# Patient Record
Sex: MALE | Race: BLACK OR AFRICAN AMERICAN | NOT HISPANIC OR LATINO | Employment: OTHER | URBAN - NONMETROPOLITAN AREA
[De-identification: names, ages, dates, MRNs, and addresses within clinical notes are randomized per-mention and may not be internally consistent; named-entity substitution may affect disease eponyms.]

---

## 2023-05-25 ENCOUNTER — DOCUMENTATION ONLY (OUTPATIENT)
Dept: FAMILY MEDICINE | Facility: CLINIC | Age: 33
End: 2023-05-25

## 2023-08-22 ENCOUNTER — DOCUMENTATION ONLY (OUTPATIENT)
Dept: FAMILY MEDICINE | Facility: CLINIC | Age: 33
End: 2023-08-22

## 2023-08-23 ENCOUNTER — DOCUMENTATION ONLY (OUTPATIENT)
Dept: FAMILY MEDICINE | Facility: CLINIC | Age: 33
End: 2023-08-23

## 2023-09-07 ENCOUNTER — DOCUMENTATION ONLY (OUTPATIENT)
Dept: PEDIATRICS | Facility: CLINIC | Age: 33
End: 2023-09-07

## 2024-03-06 ENCOUNTER — HOSPITAL ENCOUNTER (EMERGENCY)
Facility: HOSPITAL | Age: 34
Discharge: HOME | End: 2024-03-07
Attending: INTERNAL MEDICINE
Payer: MEDICARE

## 2024-03-06 DIAGNOSIS — R07.9 CHEST PAIN, UNSPECIFIED TYPE: Primary | ICD-10-CM

## 2024-03-06 DIAGNOSIS — R07.9 CHEST PAIN: ICD-10-CM

## 2024-03-06 PROCEDURE — 93005 ELECTROCARDIOGRAM TRACING: CPT

## 2024-03-06 PROCEDURE — 99285 EMERGENCY DEPT VISIT HI MDM: CPT | Mod: 25

## 2024-03-06 PROCEDURE — 83880 ASSAY OF NATRIURETIC PEPTIDE: CPT | Performed by: INTERNAL MEDICINE

## 2024-03-06 PROCEDURE — 85025 COMPLETE CBC W/AUTO DIFF WBC: CPT | Performed by: INTERNAL MEDICINE

## 2024-03-07 VITALS
HEART RATE: 96 BPM | OXYGEN SATURATION: 98 % | HEIGHT: 69 IN | TEMPERATURE: 98 F | BODY MASS INDEX: 44.43 KG/M2 | DIASTOLIC BLOOD PRESSURE: 96 MMHG | RESPIRATION RATE: 18 BRPM | WEIGHT: 300 LBS | SYSTOLIC BLOOD PRESSURE: 146 MMHG

## 2024-03-07 PROBLEM — E11.9 DIABETES MELLITUS: Status: ACTIVE | Noted: 2024-03-07

## 2024-03-07 PROBLEM — I10 HYPERTENSION: Status: ACTIVE | Noted: 2024-03-07

## 2024-03-07 PROBLEM — E78.00 HYPERCHOLESTEROLEMIA: Status: ACTIVE | Noted: 2024-03-07

## 2024-03-07 LAB
ALBUMIN SERPL-MCNC: 3.8 G/DL (ref 3.5–5)
ALBUMIN/GLOB SERPL: 1.1 RATIO (ref 1.1–2)
ALP SERPL-CCNC: 91 UNIT/L (ref 40–150)
ALT SERPL-CCNC: 23 UNIT/L (ref 0–55)
AST SERPL-CCNC: 14 UNIT/L (ref 5–34)
BASOPHILS # BLD AUTO: 0.03 X10(3)/MCL
BASOPHILS NFR BLD AUTO: 0.3 %
BILIRUB SERPL-MCNC: 0.3 MG/DL
BNP BLD-MCNC: <10 PG/ML
BUN SERPL-MCNC: 12 MG/DL (ref 8.9–20.6)
CALCIUM SERPL-MCNC: 9.4 MG/DL (ref 8.4–10.2)
CHLORIDE SERPL-SCNC: 103 MMOL/L (ref 98–107)
CO2 SERPL-SCNC: 27 MMOL/L (ref 22–29)
CREAT SERPL-MCNC: 0.91 MG/DL (ref 0.73–1.18)
D DIMER PPP IA.FEU-MCNC: 0.34 UG/ML FEU (ref 0–0.5)
EOSINOPHIL # BLD AUTO: 0.26 X10(3)/MCL (ref 0–0.9)
EOSINOPHIL NFR BLD AUTO: 2.9 %
ERYTHROCYTE [DISTWIDTH] IN BLOOD BY AUTOMATED COUNT: 13.4 % (ref 11.5–17)
GFR SERPLBLD CREATININE-BSD FMLA CKD-EPI: >60 MLS/MIN/1.73/M2
GLOBULIN SER-MCNC: 3.4 GM/DL (ref 2.4–3.5)
GLUCOSE SERPL-MCNC: 115 MG/DL (ref 74–100)
HCT VFR BLD AUTO: 40.4 % (ref 42–52)
HGB BLD-MCNC: 13.2 G/DL (ref 14–18)
IMM GRANULOCYTES # BLD AUTO: 0.02 X10(3)/MCL (ref 0–0.04)
IMM GRANULOCYTES NFR BLD AUTO: 0.2 %
LYMPHOCYTES # BLD AUTO: 2.44 X10(3)/MCL (ref 0.6–4.6)
LYMPHOCYTES NFR BLD AUTO: 27.3 %
MCH RBC QN AUTO: 28.1 PG (ref 27–31)
MCHC RBC AUTO-ENTMCNC: 32.7 G/DL (ref 33–36)
MCV RBC AUTO: 86.1 FL (ref 80–94)
MONOCYTES # BLD AUTO: 0.78 X10(3)/MCL (ref 0.1–1.3)
MONOCYTES NFR BLD AUTO: 8.7 %
NEUTROPHILS # BLD AUTO: 5.42 X10(3)/MCL (ref 2.1–9.2)
NEUTROPHILS NFR BLD AUTO: 60.6 %
OHS QRS DURATION: 80 MS
OHS QTC CALCULATION: 445 MS
PLATELET # BLD AUTO: 155 X10(3)/MCL (ref 130–400)
PMV BLD AUTO: 11.9 FL (ref 7.4–10.4)
POTASSIUM SERPL-SCNC: 3.6 MMOL/L (ref 3.5–5.1)
PROT SERPL-MCNC: 7.2 GM/DL (ref 6.4–8.3)
RBC # BLD AUTO: 4.69 X10(6)/MCL (ref 4.7–6.1)
SODIUM SERPL-SCNC: 139 MMOL/L (ref 136–145)
TROPONIN I SERPL-MCNC: <0.01 NG/ML (ref 0–0.04)
WBC # SPEC AUTO: 8.95 X10(3)/MCL (ref 4.5–11.5)

## 2024-03-07 PROCEDURE — 85379 FIBRIN DEGRADATION QUANT: CPT | Performed by: INTERNAL MEDICINE

## 2024-03-07 PROCEDURE — 84484 ASSAY OF TROPONIN QUANT: CPT | Performed by: INTERNAL MEDICINE

## 2024-03-07 PROCEDURE — 80053 COMPREHEN METABOLIC PANEL: CPT | Performed by: INTERNAL MEDICINE

## 2024-03-07 RX ORDER — SITAGLIPTIN AND METFORMIN HYDROCHLORIDE 1000; 100 MG/1; MG/1
1 TABLET, FILM COATED, EXTENDED RELEASE ORAL DAILY
COMMUNITY
Start: 2024-02-01

## 2024-03-07 RX ORDER — LISINOPRIL 10 MG/1
10 TABLET ORAL DAILY
COMMUNITY
Start: 2023-12-20

## 2024-03-07 NOTE — DISCHARGE INSTRUCTIONS
See a cardiologist/Heart Doctor to do a stress test and evaluate further as soon as possible,    Avoid any strenuous activity till you see the cardiologist.    If you do not have a cardiologist talk to your family doctor to refer you to one today.    Take an aspirin every day if not allergic to it.        Take medicines as prescribed    See your family doctor in one to 2 days for further evaluation, workup, and treatment as necessary    Avoid driving or operating machinery while taking medicines as some medicines might cause drowsiness and may cause problems. Also pain medicines have potential of being addictive  so use Pain meds specially Narcotics Sparingly.    The exam and treatment you received in Emergency Room was for an urgent problem and NOT INTENDED AS COMPLETE CARE. It is important that you FOLLOW UP with a doctor for ongoing care. If your symptoms become WORSE or you DO NOT IMPROVE and you are unable to reach your health care provider, you should RETURN to the emergency department. The Emergency Room doctor has provided a PRELIMINARY INTERPRETATION of all your STUDIES. A final interpretation may be done after you are discharged. IF A CHANGE in your diagnosis or treatment is needed WE WILL CONTACT YOU. It is critical that we have a CURRENT PHONE NUMBER FOR YOU.

## 2024-03-07 NOTE — ED PROVIDER NOTES
Encounter Date: 3/6/2024  History from patient     History     Chief Complaint   Patient presents with    Chest Pain     Pt brought [facility] with c/o cp x 45 min.     LINDA Shirley is 33 y.o. male who  has a past medical history of Anxiety disorder, unspecified, Diabetes mellitus, Hypercholesterolemia, and Hypertension. arrives in ER with c/o Chest Pain (Pt brought in [facility] with c/o cp x 45 min.)      Review of patient's allergies indicates:  No Known Allergies  Past Medical History:   Diagnosis Date    Anxiety disorder, unspecified     Diabetes mellitus     Hypercholesterolemia     Hypertension      History reviewed. No pertinent surgical history.  Family History   Problem Relation Age of Onset    Cancer Mother     Cancer Father     Leukemia Father      Social History     Tobacco Use    Smoking status: Never    Smokeless tobacco: Never   Substance Use Topics    Alcohol use: Never    Drug use: Never     Patient Active Problem List   Diagnosis    Hypertension    Diabetes mellitus    Hypercholesterolemia       Review of Systems   Constitutional:  Negative for fever.   HENT:  Negative for trouble swallowing and voice change.    Eyes:  Negative for visual disturbance.   Respiratory:  Positive for chest tightness and shortness of breath. Negative for cough.    Cardiovascular:  Negative for chest pain.   Gastrointestinal:  Negative for abdominal pain, diarrhea and vomiting.   Genitourinary:  Negative for dysuria and hematuria.   Musculoskeletal:  Negative for back pain and gait problem.   Skin:  Negative for color change and rash.   Neurological:  Negative for headaches.   Psychiatric/Behavioral:  Negative for behavioral problems and sleep disturbance.    All other systems reviewed and are negative.      Physical Exam     Initial Vitals [03/06/24 2336]   BP Pulse Resp Temp SpO2   (!) 183/116 103 18 98.1 °F (36.7 °C) 99 %      MAP       --         Physical Exam    Nursing note and vitals  reviewed.  Constitutional: He appears well-developed and well-nourished. No distress.   Patient with a high BMI   HENT:   Head: Atraumatic.   Eyes: EOM are normal.   Neck: Neck supple.   Cardiovascular:  Normal rate, regular rhythm and normal heart sounds.           Pulmonary/Chest: Breath sounds normal. No respiratory distress. He has no wheezes.   Abdominal: Abdomen is soft. Bowel sounds are normal. He exhibits no distension. There is no abdominal tenderness. There is no rebound and no guarding.   Musculoskeletal:         General: No edema. Normal range of motion.      Cervical back: Neck supple. No bony tenderness.     Neurological: He is alert. GCS score is 15. GCS eye subscore is 4. GCS verbal subscore is 5. GCS motor subscore is 6.   Speech Normal   Skin: Skin is dry.   Psychiatric: He has a normal mood and affect.   Pleasant         ED Course   Procedures  Orders Placed This Encounter   Procedures    X-ray Chest AP Portable    Comprehensive metabolic panel    CBC auto differential    Troponin I    Brain natriuretic peptide    CBC with Differential    D dimer, quantitative    EKG 12-LEAD     Medications - No data to display  Admission on 03/06/2024, Discharged on 03/07/2024   Component Date Value Ref Range Status    Sodium Level 03/07/2024 139  136 - 145 mmol/L Final    Potassium Level 03/07/2024 3.6  3.5 - 5.1 mmol/L Final    Chloride 03/07/2024 103  98 - 107 mmol/L Final    Carbon Dioxide 03/07/2024 27  22 - 29 mmol/L Final    Glucose Level 03/07/2024 115 (H)  74 - 100 mg/dL Final    Blood Urea Nitrogen 03/07/2024 12.0  8.9 - 20.6 mg/dL Final    Creatinine 03/07/2024 0.91  0.73 - 1.18 mg/dL Final    Calcium Level Total 03/07/2024 9.4  8.4 - 10.2 mg/dL Final    Protein Total 03/07/2024 7.2  6.4 - 8.3 gm/dL Final    Albumin Level 03/07/2024 3.8  3.5 - 5.0 g/dL Final    Globulin 03/07/2024 3.4  2.4 - 3.5 gm/dL Final    Albumin/Globulin Ratio 03/07/2024 1.1  1.1 - 2.0 ratio Final    Bilirubin Total 03/07/2024  0.3  <=1.5 mg/dL Final    Alkaline Phosphatase 03/07/2024 91  40 - 150 unit/L Final    Alanine Aminotransferase 03/07/2024 23  0 - 55 unit/L Final    Aspartate Aminotransferase 03/07/2024 14  5 - 34 unit/L Final    eGFR 03/07/2024 >60  mls/min/1.73/m2 Final    Troponin-I 03/07/2024 <0.010  0.000 - 0.045 ng/mL Final    Natriuretic Peptide 03/06/2024 <10.0  <=100.0 pg/mL Final    WBC 03/06/2024 8.95  4.50 - 11.50 x10(3)/mcL Final    RBC 03/06/2024 4.69 (L)  4.70 - 6.10 x10(6)/mcL Final    Hgb 03/06/2024 13.2 (L)  14.0 - 18.0 g/dL Final    Hct 03/06/2024 40.4 (L)  42.0 - 52.0 % Final    MCV 03/06/2024 86.1  80.0 - 94.0 fL Final    MCH 03/06/2024 28.1  27.0 - 31.0 pg Final    MCHC 03/06/2024 32.7 (L)  33.0 - 36.0 g/dL Final    RDW 03/06/2024 13.4  11.5 - 17.0 % Final    Platelet 03/06/2024 155  130 - 400 x10(3)/mcL Final    MPV 03/06/2024 11.9 (H)  7.4 - 10.4 fL Final    Neut % 03/06/2024 60.6  % Final    Lymph % 03/06/2024 27.3  % Final    Mono % 03/06/2024 8.7  % Final    Eos % 03/06/2024 2.9  % Final    Basophil % 03/06/2024 0.3  % Final    Lymph # 03/06/2024 2.44  0.6 - 4.6 x10(3)/mcL Final    Neut # 03/06/2024 5.42  2.1 - 9.2 x10(3)/mcL Final    Mono # 03/06/2024 0.78  0.1 - 1.3 x10(3)/mcL Final    Eos # 03/06/2024 0.26  0 - 0.9 x10(3)/mcL Final    Baso # 03/06/2024 0.03  <=0.2 x10(3)/mcL Final    IG# 03/06/2024 0.02  0 - 0.04 x10(3)/mcL Final    IG% 03/06/2024 0.2  % Final    D-Dimer 03/07/2024 0.34  0.00 - 0.50 ug/mL FEU Final         Labs Reviewed   COMPREHENSIVE METABOLIC PANEL - Abnormal; Notable for the following components:       Result Value    Glucose Level 115 (*)     All other components within normal limits   CBC WITH DIFFERENTIAL - Abnormal; Notable for the following components:    RBC 4.69 (*)     Hgb 13.2 (*)     Hct 40.4 (*)     MCHC 32.7 (*)     MPV 11.9 (*)     All other components within normal limits   TROPONIN I - Normal   B-TYPE NATRIURETIC PEPTIDE - Normal   D DIMER, QUANTITATIVE - Normal    CBC W/ AUTO DIFFERENTIAL    Narrative:     The following orders were created for panel order CBC auto differential.  Procedure                               Abnormality         Status                     ---------                               -----------         ------                     CBC with Differential[[id number]]        Abnormal            Final result                 Please view results for these tests on the individual orders.        ECG Results              EKG 12-LEAD (Preliminary result)  Result time 03/06/24 23:50:06      Wet Read by Cathleen Zamora MD (03/06/24 23:50:06, [facility] - Emergency Dept, Emergency Medicine)    EKG Initial Reading: Independently Interpreted by Cathleen Zamora MD. independently as: No STEMI. Rhythm: Normal Sinus Rhythm, Rate 102. Ectopy: No Ectopy. Conduction: Normal. ST Segments: Normal ST Segments. T Waves: Normal. Axis: Normal.                                   Imaging Results              X-ray Chest AP Portable (Preliminary result)  Result time 03/07/24 00:40:48      Wet Read by Cathleen Zamora MD (03/07/24 00:40:48, [facility] - Emergency Dept, Emergency Medicine)    Chest One View:  Independently reviewed and/or interpreted by Cathleen Zamora MD.  No Focal Consolidation, No Acute Cardiopulmonary abnormality identified grossly.                                     Medications - No data to display  Medical Decision Making    William Shirley is 33 y.o. male who  has a past medical history of Anxiety disorder, unspecified, Diabetes mellitus, Hypercholesterolemia, and Hypertension. arrives in ER with c/o Chest Pain ([facility] brought in [facility] with c/o cp x 45 min.)    Patient with elevated BMI with history of diabetes, hypertension, hypercholesterolemia comes to the emergency room by ambulance with complaint of chest pain which started while he was watching television, according to medics his blood pressure was elevated, they gave him  nitroglycerin, splint, he still has some chest pain, patient denies any other complaints whatsoever.    According to patient's record he is supposed to be on 3 blood pressure medicines, medicine for diabetes, medicine for anxiety and depression, but he says he is only taking 1 medicine for diabetes and 1 medicine for blood pressure which appears to be Janumet and lisinopril, he has not taking any other medicines whatsoever.  He says they just did not give him the medicine anymore when he went back to his doctor.    EKG on arrival does not show any acute abnormality he has a heart rate of 102, his oxygen saturation is 99% on room air, blood pressure is elevated, I am going to monitor it for the time being and do a cardiac workup on him, I will do a D-dimer since he has a heart rate of 103 but his oxygen saturations 99%.    Amount and/or Complexity of Data Reviewed  Labs: ordered.  Radiology: ordered and independent interpretation performed.      Additional MDM:   Differential Diagnosis:   Symptom: Chest pain. <> The follow diagnoses were considered and will be evaluated: Acute MI, Chest Wall Pain, GERD, Pneumonia, Pulmonary Embolism and ST Elevation MI.      Chest Pain: Medications: Aspirin and Nitroglycerin SL. Initial EKG: no acute changes.           ED Course as of 03/07/24 0408   Thu Mar 07, 2024   0054 Patient's workup is essentially negative in the emergency room, his troponin is 0, BNP A0, chest x-ray is normal, EKG is normal, chemistry overall is normal, I will advise him that he should see his family doctor to adjust his blood pressure medicine and he can see a cardiologist to do a stress test on him. [GQ]   0056 Patient's blood pressures come down, he is feeling better I will let him go home. [GQ]      ED Course User Index  [GQ] Cathleen Zamora MD                           Clinical Impression:  Final diagnoses:  [R07.9] Chest pain  [R07.9] Chest pain, unspecified type (Primary)          ED Disposition  Condition    Discharge Stable          ED Prescriptions    None       Follow-up Information       Follow up With Specialties Details Why Contact Info    Tutu Tao MD Family Medicine In 2 days  [address] [facility] [address]  [phone]      Cardiologist                 Cathleen Zamora MD  03/07/24 8712

## 2024-03-07 NOTE — ED NOTES
Pt to ed rm 4 form [facility]. Aaox4. Pt reports left sided chest pain that started 30min ago. Reports mild sob. Chest pain in constant. Hx of dm, htn. In no distress. On monitors. Wctm

## 2024-03-11 ENCOUNTER — HOSPITAL ENCOUNTER (EMERGENCY)
Facility: HOSPITAL | Age: 34
Discharge: HOME | End: 2024-03-11
Attending: INTERNAL MEDICINE
Payer: MEDICARE

## 2024-03-11 VITALS
RESPIRATION RATE: 20 BRPM | WEIGHT: 300 LBS | DIASTOLIC BLOOD PRESSURE: 109 MMHG | HEART RATE: 73 BPM | HEIGHT: 69 IN | SYSTOLIC BLOOD PRESSURE: 166 MMHG | OXYGEN SATURATION: 99 % | BODY MASS INDEX: 44.43 KG/M2 | TEMPERATURE: 97 F

## 2024-03-11 DIAGNOSIS — R07.9 CHEST PAIN, UNSPECIFIED TYPE: ICD-10-CM

## 2024-03-11 DIAGNOSIS — I10 UNCONTROLLED HYPERTENSION: Primary | ICD-10-CM

## 2024-03-11 LAB
ALBUMIN SERPL-MCNC: 3.9 G/DL (ref 3.5–5)
ALBUMIN/GLOB SERPL: 1.1 RATIO (ref 1.1–2)
ALP SERPL-CCNC: 95 UNIT/L (ref 40–150)
ALT SERPL-CCNC: 27 UNIT/L (ref 0–55)
AST SERPL-CCNC: 20 UNIT/L (ref 5–34)
BASOPHILS # BLD AUTO: 0.04 X10(3)/MCL
BASOPHILS NFR BLD AUTO: 0.5 %
BILIRUB SERPL-MCNC: 0.4 MG/DL
BNP BLD-MCNC: 55.4 PG/ML
BUN SERPL-MCNC: 10 MG/DL (ref 8.9–20.6)
CALCIUM SERPL-MCNC: 9.3 MG/DL (ref 8.4–10.2)
CHLORIDE SERPL-SCNC: 102 MMOL/L (ref 98–107)
CO2 SERPL-SCNC: 30 MMOL/L (ref 22–29)
CREAT SERPL-MCNC: 0.96 MG/DL (ref 0.73–1.18)
EOSINOPHIL # BLD AUTO: 0.18 X10(3)/MCL (ref 0–0.9)
EOSINOPHIL NFR BLD AUTO: 2.3 %
ERYTHROCYTE [DISTWIDTH] IN BLOOD BY AUTOMATED COUNT: 13 % (ref 11.5–17)
GFR SERPLBLD CREATININE-BSD FMLA CKD-EPI: >60 MLS/MIN/1.73/M2
GLOBULIN SER-MCNC: 3.7 GM/DL (ref 2.4–3.5)
GLUCOSE SERPL-MCNC: 150 MG/DL (ref 74–100)
HCT VFR BLD AUTO: 42.8 % (ref 42–52)
HGB BLD-MCNC: 14 G/DL (ref 14–18)
IMM GRANULOCYTES # BLD AUTO: 0.03 X10(3)/MCL (ref 0–0.04)
IMM GRANULOCYTES NFR BLD AUTO: 0.4 %
LYMPHOCYTES # BLD AUTO: 2.54 X10(3)/MCL (ref 0.6–4.6)
LYMPHOCYTES NFR BLD AUTO: 32.4 %
MCH RBC QN AUTO: 28.2 PG (ref 27–31)
MCHC RBC AUTO-ENTMCNC: 32.7 G/DL (ref 33–36)
MCV RBC AUTO: 86.1 FL (ref 80–94)
MONOCYTES # BLD AUTO: 0.7 X10(3)/MCL (ref 0.1–1.3)
MONOCYTES NFR BLD AUTO: 8.9 %
NEUTROPHILS # BLD AUTO: 4.35 X10(3)/MCL (ref 2.1–9.2)
NEUTROPHILS NFR BLD AUTO: 55.5 %
OHS QRS DURATION: 82 MS
OHS QTC CALCULATION: 412 MS
PLATELET # BLD AUTO: 247 X10(3)/MCL (ref 130–400)
PMV BLD AUTO: 11.1 FL (ref 7.4–10.4)
POCT GLUCOSE: 157 MG/DL (ref 70–110)
POTASSIUM SERPL-SCNC: 4.6 MMOL/L (ref 3.5–5.1)
PROT SERPL-MCNC: 7.6 GM/DL (ref 6.4–8.3)
RBC # BLD AUTO: 4.97 X10(6)/MCL (ref 4.7–6.1)
SODIUM SERPL-SCNC: 139 MMOL/L (ref 136–145)
TROPONIN I SERPL-MCNC: <0.01 NG/ML (ref 0–0.04)
WBC # SPEC AUTO: 7.84 X10(3)/MCL (ref 4.5–11.5)

## 2024-03-11 PROCEDURE — 99285 EMERGENCY DEPT VISIT HI MDM: CPT | Mod: 25

## 2024-03-11 PROCEDURE — 93005 ELECTROCARDIOGRAM TRACING: CPT

## 2024-03-11 PROCEDURE — 85025 COMPLETE CBC W/AUTO DIFF WBC: CPT | Performed by: INTERNAL MEDICINE

## 2024-03-11 PROCEDURE — 83880 ASSAY OF NATRIURETIC PEPTIDE: CPT | Performed by: INTERNAL MEDICINE

## 2024-03-11 PROCEDURE — 84484 ASSAY OF TROPONIN QUANT: CPT | Performed by: INTERNAL MEDICINE

## 2024-03-11 PROCEDURE — 82962 GLUCOSE BLOOD TEST: CPT

## 2024-03-11 PROCEDURE — 80053 COMPREHEN METABOLIC PANEL: CPT | Performed by: INTERNAL MEDICINE

## 2024-03-11 RX ORDER — NAPROXEN SODIUM 220 MG/1
324 TABLET, FILM COATED ORAL
Status: DISCONTINUED | OUTPATIENT
Start: 2024-03-11 | End: 2024-03-11 | Stop reason: HOSPADM

## 2024-03-11 NOTE — DISCHARGE INSTRUCTIONS
Must take your blood pressure medicines regularly as prescribed by the doctor.  And talk to the doctor to adjust the medication to control the blood pressure properly.    See a cardiologist/Heart Doctor to do a stress test and evaluate further as soon as possible,    Avoid any strenuous activity till you see the cardiologist.    If you do not have a cardiologist talk to your family doctor to refer you to one today.    Take an aspirin every day if not allergic to it.      Take medicines as prescribed    See your family doctor in one to 2 days for further evaluation, workup, and treatment as necessary    Avoid driving or operating machinery while taking medicines as some medicines might cause drowsiness and may cause problems. Also pain medicines have potential of being addictive  so use Pain meds specially Narcotics Sparingly.    The exam and treatment you received in Emergency Room was for an urgent problem and NOT INTENDED AS COMPLETE CARE. It is important that you FOLLOW UP with a doctor for ongoing care. If your symptoms become WORSE or you DO NOT IMPROVE and you are unable to reach your health care provider, you should RETURN to the emergency department. The Emergency Room doctor has provided a PRELIMINARY INTERPRETATION of all your STUDIES. A final interpretation may be done after you are discharged. IF A CHANGE in your diagnosis or treatment is needed WE WILL CONTACT YOU. It is critical that we have a CURRENT PHONE NUMBER FOR YOU.

## 2024-03-11 NOTE — ED PROVIDER NOTES
03/11/2024         9:57 AM    Source of History:  History obtained from the patient.     Chief complaint:  From Nurse Triage:  Chest Pain (Chest pain and shortness of breath starting this morning. 1 SL nitro and 324 ASA given in route per EMS.)    HISTORY OF PRESENT ILLNES:  William Shirley is a 33 y.o. male  has a past medical history of Anxiety disorder, unspecified, Diabetes mellitus, Hypercholesterolemia, and Hypertension. presenting with Chest Pain (Chest pain and shortness of breath starting this morning. 1 SL nitro and 324 ASA given in route per EMS.)      REVIEW OF SYSTEMS:   Constitutional symptoms:  No Fever. No Chills    Skin symptoms:  No Rash.    Eye symptoms:  No Visual disturbance reported.   ENMT symptoms:  No Sore throat,    Respiratory symptoms:  No Shortness of Breath, no Cough, no Wheezing.    Cardiovascular symptoms:  Chest Pain, No Palpitations.   Gastrointestinal symptoms:  No Abdominal Pain, No Nausea, No Vomiting, No Diarrhea, No Constipation.    Genitourinary symptoms:  No Dysuria,    Musculoskeletal symptoms:  No Back pain,    Neurologic symptoms:  No Headache, No Dizziness.    Psychiatric symptoms:  No Anxiety, No Depression, No Substance Abuse.              Additional review of systems information: Patient Denies Any Other Complaints.    All Other Systems Reviewed With Patient And Negative.    ALLEGIES:  Review of patient's allergies indicates:  No Known Allergies    MEDICINE LIST:  No current facility-administered medications on file prior to encounter.     Current Outpatient Medications on File Prior to Encounter   Medication Sig Dispense Refill    JANUMET -1,000 mg TM24 Take 1 tablet by mouth once daily.      lisinopriL 10 MG tablet Take 10 mg by mouth once daily.         PMH:  As per HPI and below:    Reviewed and updated in chart.    PAST MEDICAL HISTORY:  Past Medical History:   Diagnosis Date    Anxiety disorder, unspecified     Diabetes mellitus     Hypercholesterolemia      Hypertension         PAST SURGICAL HISTORY:  History reviewed. No pertinent surgical history.    SOCIAL HISTORY:  Social History     Tobacco Use    Smoking status: Never    Smokeless tobacco: Never   Substance Use Topics    Alcohol use: Never    Drug use: Never       FAMILY HISTORY:  Family History   Problem Relation Age of Onset    Cancer Mother     Cancer Father     Leukemia Father         PROBLEM LIST:  Patient Active Problem List   Diagnosis    Hypertension    Diabetes mellitus    Hypercholesterolemia        PHYSICAL EXAM:      ED Triage Vitals [03/11/24 0908]   BP (!) 172/121   Pulse 77   Resp 20   Temp 97.2 °F (36.2 °C)   SpO2 99 %        Vital Signs: Reviewed As In Chart.  General:  Alert, No Cardiorespiratory Distress Noted.   Eye:  Extraocular Movements Are Intact.   ENT: Mucus membranes are moist.   Cardiovascular:  Regular Rate And Rhythm, No Murmur, No Pedal Edema.    Respiratory:  Respirations Nonlabored, No Respiratory Distress, Good Bilateral Air Entry, No Rales, No Rhonchi.    Gastrointestinal:  Soft, Non Distended, Non Tenderness, Normal Bowel Sounds.    Neurological:  Alert And Oriented To Person, Place, Time, And Situation, Normal Motor Observed, Normal Speech Observed.  Musculoskeletal:  No Gross Deformity Noted.     Psychiatric:  Cooperative.      ED WORKUP FOR MEDICAL DECISION MAKING:    ED ORDERS:  Orders Placed This Encounter   Procedures    X-ray Chest AP Portable    Comprehensive metabolic panel    CBC auto differential    Troponin I    Brain natriuretic peptide    CBC with Differential    Diet NPO    Vital signs    Cardiac Monitoring - Adult    Oxygen Continuous    Pulse Oximetry Continuous    POCT Glucose, Hand-Held Device    EKG 12-LEAD    Insert saline lock       ED MEDICINES:  Medications   aspirin chewable tablet 324 mg (324 mg Oral Not Given 3/11/24 0915)            ECG Results              EKG 12-LEAD (Preliminary result)        Collection Time Result Time QRS Duration [facility] QTC  Calculation    03/11/24 09:12:09 03/11/24 09:57:26 82 412                     Wet Read by Cathleen Zamora MD (03/11/24 09:57:33, [facility] - Emergency Dept, Emergency Medicine)    EKG Initial Reading: Independently Interpreted by Cathleen Zamora MD. independently as: No STEMI. Rhythm: Normal Sinus Rhythm, Rate 78. Ectopy: No Ectopy. Conduction: Normal. ST Segments: Normal ST Segments. T Waves: Normal. Axis: Normal.                       In process by Interface, Lab In [facility] (03/11/24 09:47:55)                   Narrative:    Test Reason : R07.9,    Vent. Rate : 078 BPM     Atrial Rate : 078 BPM     P-R Int : 180 ms          QRS Dur : 082 ms      QT Int : 362 ms       P-R-T Axes : 060 -05 043 degrees     QTc Int : 412 ms    Normal sinus rhythm  Normal ECG  When compared with ECG of 06-MAR-2024 23:46,  No significant change was found    Referred By: [email or web address]   SELF           Confirmed By:                                     ED LABS ORDERED AND REVIEWED:  Admission on 03/11/2024   Component Date Value Ref Range Status    Sodium Level 03/11/2024 139  136 - 145 mmol/L Final    Potassium Level 03/11/2024 4.6  3.5 - 5.1 mmol/L Final    Chloride 03/11/2024 102  98 - 107 mmol/L Final    Carbon Dioxide 03/11/2024 30 (H)  22 - 29 mmol/L Final    Glucose Level 03/11/2024 150 (H)  74 - 100 mg/dL Final    Blood Urea Nitrogen 03/11/2024 10.0  8.9 - 20.6 mg/dL Final    Creatinine 03/11/2024 0.96  0.73 - 1.18 mg/dL Final    Calcium Level Total 03/11/2024 9.3  8.4 - 10.2 mg/dL Final    Protein Total 03/11/2024 7.6  6.4 - 8.3 gm/dL Final    Albumin Level 03/11/2024 3.9  3.5 - 5.0 g/dL Final    Globulin 03/11/2024 3.7 (H)  2.4 - 3.5 gm/dL Final    Albumin/Globulin Ratio 03/11/2024 1.1  1.1 - 2.0 ratio Final    Bilirubin Total 03/11/2024 0.4  <=1.5 mg/dL Final    Alkaline Phosphatase 03/11/2024 95  40 - 150 unit/L Final    Alanine Aminotransferase 03/11/2024 27  0 - 55 unit/L Final    Aspartate Aminotransferase  03/11/2024 20  5 - 34 unit/L Final    eGFR 03/11/2024 >60  mls/min/1.73/m2 Final    Troponin-I 03/11/2024 <0.010  0.000 - 0.045 ng/mL Final    Natriuretic Peptide 03/11/2024 55.4  <=100.0 pg/mL Final    QRS Duration 03/11/2024 82  ms Preliminary    OHS QTC Calculation 03/11/2024 412  ms Preliminary    WBC 03/11/2024 7.84  4.50 - 11.50 x10(3)/mcL Final    RBC 03/11/2024 4.97  4.70 - 6.10 x10(6)/mcL Final    Hgb 03/11/2024 14.0  14.0 - 18.0 g/dL Final    Hct 03/11/2024 42.8  42.0 - 52.0 % Final    MCV 03/11/2024 86.1  80.0 - 94.0 fL Final    MCH 03/11/2024 28.2  27.0 - 31.0 pg Final    MCHC 03/11/2024 32.7 (L)  33.0 - 36.0 g/dL Final    RDW 03/11/2024 13.0  11.5 - 17.0 % Final    Platelet 03/11/2024 247  130 - 400 x10(3)/mcL Final    MPV 03/11/2024 11.1 (H)  7.4 - 10.4 fL Final    Neut % 03/11/2024 55.5  % Final    Lymph % 03/11/2024 32.4  % Final    Mono % 03/11/2024 8.9  % Final    Eos % 03/11/2024 2.3  % Final    Basophil % 03/11/2024 0.5  % Final    Lymph # 03/11/2024 2.54  0.6 - 4.6 x10(3)/mcL Final    Neut # 03/11/2024 4.35  2.1 - 9.2 x10(3)/mcL Final    Mono # 03/11/2024 0.70  0.1 - 1.3 x10(3)/mcL Final    Eos # 03/11/2024 0.18  0 - 0.9 x10(3)/mcL Final    Baso # 03/11/2024 0.04  <=0.2 x10(3)/mcL Final    IG# 03/11/2024 0.03  0 - 0.04 x10(3)/mcL Final    IG% 03/11/2024 0.4  % Final    POCT Glucose 03/11/2024 157 (H)  70 - 110 mg/dL Final       RADIOLOGY STUDIES ORDERED AND REVIEWED:  Imaging Results              X-ray Chest AP Portable (Preliminary result)  Result time 03/11/24 09:58:02      Wet Read by Cathleen Zamora MD (03/11/24 09:58:02, [facility] - Emergency Dept, Emergency Medicine)    Chest One View:  Independently reviewed and/or interpreted by Cathleen Zamora MD.  No Focal Consolidation, No Acute Cardiopulmonary abnormality identified grossly.  Questionable cardiomegaly                                    MEDICAL DECISION MAKING:    William Shirley is 33 y.o. male who  has a past medical  history of Anxiety disorder, unspecified, Diabetes mellitus, Hypercholesterolemia, and Hypertension. arrives in ER with c/o Chest Pain (Chest pain and shortness of breath starting this morning. 1 SL nitro and 324 ASA given in route per EMS.)  Essentially comes back to the emergency room with complaint of chest pain, he was seen recently in the emergency room with the same complaint and had a normal workup, he was told to follow up with the cardiologist but he has not gone to 1, he has not seen his family doctor either, on arrival his blood pressure was elevated but with rest it has come down nicely, I do not want to bring it down further, I will advise him that he must take his blood pressure medicines regularly    Reviewed Nurses Note. Reviewed Vital Signs.     Reviewed Pertinent old records, History and updated as necessary.    Vitals:    03/11/24 0939   BP: (!) 166/109   Pulse: 73   Resp: 20   Temp:         Medical Decision Making  Amount and/or Complexity of Data Reviewed  Labs: ordered.  Radiology: ordered.    Risk  OTC drugs.              ED Course as of 03/11/24 1003   Mon Mar 11, 2024   0958 Patient's workup is again as normal as it can be, he did take according to him his blood pressure medicine and his diabetes medicine this morning, he says he has not gone to see the cardiologist yet, he has a doctor in [place] but he does not go there because he does not have a ride, he found a new doctor in [facility], his workup is again perfectly normal for as far as his heart is concerned, I will advise him that he must take his blood pressure medicine as prescribed by the doctor on a regular basis without a break and he must see a cardiologist.  He must see his family doctor as soon as possible for follow-up.  He will need adjustment in his blood pressure medication.  I will let him go home. [GQ]      ED Course User Index  [GQ] Cathleen Zamora MD            PROCEDURES PERFORMED IN  ED:  Procedures    DIAGNOSTIC IMPRESSION:        ICD-10-CM ICD-9-CM   1. Uncontrolled hypertension  I10 401.9   2. Chest pain, unspecified type  R07.9 786.50         ED Disposition Condition    Discharge Stable               Medication List        ASK your doctor about these medications      JANUMET -1,000 mg Tm24  Generic drug: SITagliptan-metformin     lisinopriL 10 MG tablet                Follow-up Information       Tutu Tao MD In 1 day.    Specialty: Family Medicine  Contact information:  [address] [facility] [address]  [phone]               Cardiologist.                              ED Prescriptions    None       Follow-up Information       Follow up With Specialties Details Why Contact Info    Tutu Tao MD Family Medicine In 1 day  [address] [facility] [address]  [phone]      Cardiologist                   Cathleen Zamora MD  03/11/24 6411

## 2024-04-23 ENCOUNTER — DOCUMENTATION ONLY (OUTPATIENT)
Dept: PEDIATRICS | Facility: CLINIC | Age: 34
End: 2024-04-23
Payer: MEDICARE

## 2024-07-11 ENCOUNTER — HOSPITAL ENCOUNTER (OUTPATIENT)
Dept: CARDIOLOGY | Facility: HOSPITAL | Age: 34
Discharge: HOME | End: 2024-07-11
Attending: INTERNAL MEDICINE
Payer: MEDICARE

## 2024-07-11 DIAGNOSIS — R94.31 ABNORMAL EKG: ICD-10-CM

## 2024-07-11 LAB
AORTIC VALVE CUSP SEPERATION: 2.14 CM
ASCENDING AORTA: 2.96 CM
AV INDEX (PROSTH): 0.86
AV MEAN GRADIENT: 3 MMHG
AV PEAK GRADIENT: 6 MMHG
AV VALVE AREA BY VELOCITY RATIO: 3.84 CM²
AV VALVE AREA: 3.61 CM²
AV VELOCITY RATIO: 0.92
CV ECHO LV RWT: 0.73 CM
DOP CALC AO PEAK VEL: 1.2 M/S
DOP CALC AO VTI: 23.8 CM
DOP CALC LVOT AREA: 4.2 CM2
DOP CALC LVOT DIAMETER: 2.31 CM
DOP CALC LVOT PEAK VEL: 1.1 M/S
DOP CALC LVOT STROKE VOLUME: 85.87 CM3
DOP CALCLVOT PEAK VEL VTI: 20.5 CM
E WAVE DECELERATION TIME: 163.5 MSEC
E/A RATIO: 1
E/E' RATIO: 4.67 M/S
ECHO LV POSTERIOR WALL: 1.67 CM (ref 0.6–1.1)
FRACTIONAL SHORTENING: 35 % (ref 28–44)
INFERIOR VENA CAVA SIZE SNIFF: 0.4 CM
INTERVENTRICULAR SEPTUM: 1.64 CM (ref 0.6–1.1)
IVC DIAMETER: 1.6 CM
LEFT ATRIUM SIZE: 3.15 CM
LEFT ATRIUM VOLUME MOD: 41.2 CM3
LEFT INTERNAL DIMENSION IN SYSTOLE: 2.99 CM (ref 2.1–4)
LEFT VENTRICLE DIASTOLIC VOLUME: 97.34 ML
LEFT VENTRICLE END SYSTOLIC VOLUME APICAL 2 CHAMBER: 31.3 ML
LEFT VENTRICLE END SYSTOLIC VOLUME APICAL 4 CHAMBER: 42.8 ML
LEFT VENTRICLE SYSTOLIC VOLUME: 34.72 ML
LEFT VENTRICULAR INTERNAL DIMENSION IN DIASTOLE: 4.6 CM (ref 3.5–6)
LEFT VENTRICULAR MASS: 331.38 G
LV LATERAL E/E' RATIO: 4.85 M/S
LV SEPTAL E/E' RATIO: 4.5 M/S
LVED V (TEICH): 97.34 ML
LVES V (TEICH): 34.72 ML
LVOT MG: 2.2 MMHG
LVOT MV: 0.65 CM/S
MV PEAK A VEL: 0.63 M/S
MV PEAK E VEL: 0.63 M/S
RA MAJOR: 5.3 CM
RA PRESSURE ESTIMATED: 3 MMHG
RIGHT ATRIAL AREA: 13.1 CM2
RIGHT ATRIUM VOLUME AREA LENGTH APICAL 4 CHAMBER: 27.2 ML
TDI LATERAL: 0.13 M/S
TDI SEPTAL: 0.14 M/S
TDI: 0.14 M/S
TRICUSPID ANNULAR PLANE SYSTOLIC EXCURSION: 2.26 CM

## 2024-07-11 PROCEDURE — 93306 TTE W/DOPPLER COMPLETE: CPT

## 2024-07-18 ENCOUNTER — HOSPITAL ENCOUNTER (OUTPATIENT)
Dept: CARDIOLOGY | Facility: HOSPITAL | Age: 34
Discharge: HOME | End: 2024-07-18
Attending: INTERNAL MEDICINE
Payer: MEDICARE

## 2024-07-18 ENCOUNTER — HOSPITAL ENCOUNTER (OUTPATIENT)
Dept: RADIOLOGY | Facility: HOSPITAL | Age: 34
Discharge: HOME | End: 2024-07-18
Attending: INTERNAL MEDICINE
Payer: MEDICARE

## 2024-07-18 VITALS — WEIGHT: 300 LBS | BODY MASS INDEX: 44.3 KG/M2

## 2024-07-18 DIAGNOSIS — R94.31 ABNORMAL EKG: ICD-10-CM

## 2024-07-18 LAB
CV STRESS BASE HR: 84 BPM
DIASTOLIC BLOOD PRESSURE: 97 MMHG
EJECTION FRACTION- HIGH: 65 %
END DIASTOLIC INDEX-HIGH: 158 ML/M2
END DIASTOLIC INDEX-LOW: 94 ML/M2
END SYSTOLIC INDEX-HIGH: 71 ML/M2
END SYSTOLIC INDEX-LOW: 33 ML/M2
NUC REST DIASTOLIC VOLUME INDEX: 126
NUC REST EJECTION FRACTION: 60
NUC REST SYSTOLIC VOLUME INDEX: 51
NUC STRESS DIASTOLIC VOLUME INDEX: 152
NUC STRESS EJECTION FRACTION: 57 %
NUC STRESS SYSTOLIC VOLUME INDEX: 65
OHS CV CPX 85 PERCENT MAX PREDICTED HEART RATE MALE: 160
OHS CV CPX MAX PREDICTED HEART RATE: 188
OHS CV CPX PATIENT IS FEMALE: 0
OHS CV CPX PATIENT IS MALE: 1
OHS CV CPX PEAK DIASTOLIC BLOOD PRESSURE: 95 MMHG
OHS CV CPX PEAK HEAR RATE: 108 BPM
OHS CV CPX PEAK RATE PRESSURE PRODUCT: NORMAL
OHS CV CPX PEAK SYSTOLIC BLOOD PRESSURE: 160 MMHG
OHS CV CPX PERCENT MAX PREDICTED HEART RATE ACHIEVED: 57
OHS CV CPX RATE PRESSURE PRODUCT PRESENTING: NORMAL
RETIRED EF AND QEF - SEE NOTES: 53 %
SUMMED DIFFERENCE: 0
SUMMED REST SCORE: 0
SUMMED STRESS SCORE: 0
SYSTOLIC BLOOD PRESSURE: 157 MMHG

## 2024-07-18 PROCEDURE — 63600175 PHARM REV CODE 636 W HCPCS: Performed by: INTERNAL MEDICINE

## 2024-07-18 PROCEDURE — 78452 HT MUSCLE IMAGE SPECT MULT: CPT

## 2024-07-18 PROCEDURE — A9500 TC99M SESTAMIBI: HCPCS | Performed by: INTERNAL MEDICINE

## 2024-07-18 PROCEDURE — 93017 CV STRESS TEST TRACING ONLY: CPT

## 2024-07-18 RX ORDER — AMINOPHYLLINE 25 MG/ML
50 INJECTION, SOLUTION INTRAVENOUS
Status: DISCONTINUED | OUTPATIENT
Start: 2024-07-18 | End: 2024-07-19 | Stop reason: HOSPADM

## 2024-07-18 RX ORDER — REGADENOSON 0.08 MG/ML
0.4 INJECTION, SOLUTION INTRAVENOUS ONCE
Status: COMPLETED | OUTPATIENT
Start: 2024-07-18 | End: 2024-07-18

## 2024-07-18 RX ORDER — TETRAKIS(2-METHOXYISOBUTYLISOCYANIDE)COPPER(I) TETRAFLUOROBORATE 1 MG/ML
30.8 INJECTION, POWDER, LYOPHILIZED, FOR SOLUTION INTRAVENOUS
Status: COMPLETED | OUTPATIENT
Start: 2024-07-18 | End: 2024-07-18

## 2024-07-18 RX ORDER — TETRAKIS(2-METHOXYISOBUTYLISOCYANIDE)COPPER(I) TETRAFLUOROBORATE 1 MG/ML
11.6 INJECTION, POWDER, LYOPHILIZED, FOR SOLUTION INTRAVENOUS
Status: COMPLETED | OUTPATIENT
Start: 2024-07-18 | End: 2024-07-18

## 2024-07-18 RX ADMIN — KIT FOR THE PREPARATION OF TECHNETIUM TC99M SESTAMIBI 11.6 MILLICURIE: 1 INJECTION, POWDER, LYOPHILIZED, FOR SOLUTION PARENTERAL at 10:07

## 2024-07-18 RX ADMIN — REGADENOSON 0.4 MG: 0.08 INJECTION, SOLUTION INTRAVENOUS at 11:07

## 2024-07-18 RX ADMIN — KIT FOR THE PREPARATION OF TECHNETIUM TC99M SESTAMIBI 30.8 MILLICURIE: 1 INJECTION, POWDER, LYOPHILIZED, FOR SOLUTION PARENTERAL at 11:07
